# Patient Record
Sex: MALE | Race: WHITE | NOT HISPANIC OR LATINO | Employment: UNEMPLOYED | URBAN - METROPOLITAN AREA
[De-identification: names, ages, dates, MRNs, and addresses within clinical notes are randomized per-mention and may not be internally consistent; named-entity substitution may affect disease eponyms.]

---

## 2024-08-29 ENCOUNTER — OFFICE VISIT (OUTPATIENT)
Dept: UROLOGY | Facility: CLINIC | Age: 29
End: 2024-08-29
Payer: COMMERCIAL

## 2024-08-29 VITALS
TEMPERATURE: 98.2 F | DIASTOLIC BLOOD PRESSURE: 91 MMHG | HEART RATE: 128 BPM | SYSTOLIC BLOOD PRESSURE: 138 MMHG | HEIGHT: 72 IN

## 2024-08-29 DIAGNOSIS — M62.89 PELVIC FLOOR TENSION: Primary | ICD-10-CM

## 2024-08-29 DIAGNOSIS — R35.0 FREQUENCY OF URINATION: ICD-10-CM

## 2024-08-29 LAB
POC APPEARANCE, URINE: CLEAR
POC BILIRUBIN, URINE: NEGATIVE
POC BLOOD, URINE: NEGATIVE
POC COLOR, URINE: YELLOW
POC GLUCOSE, URINE: NEGATIVE MG/DL
POC KETONES, URINE: NEGATIVE MG/DL
POC LEUKOCYTES, URINE: NEGATIVE
POC NITRITE,URINE: NEGATIVE
POC PH, URINE: 6 PH
POC PROTEIN, URINE: NEGATIVE MG/DL
POC SPECIFIC GRAVITY, URINE: 1.01
POC UROBILINOGEN, URINE: 0.2 EU/DL

## 2024-08-29 PROCEDURE — 99203 OFFICE O/P NEW LOW 30 MIN: CPT | Performed by: NURSE PRACTITIONER

## 2024-08-29 PROCEDURE — 81003 URINALYSIS AUTO W/O SCOPE: CPT | Performed by: NURSE PRACTITIONER

## 2024-08-29 RX ORDER — OXYBUTYNIN CHLORIDE 5 MG/1
5 TABLET, EXTENDED RELEASE ORAL DAILY
Qty: 30 TABLET | Refills: 11 | Status: SHIPPED | OUTPATIENT
Start: 2024-08-29 | End: 2025-08-29

## 2024-08-29 NOTE — PROGRESS NOTES
UROLOGIC INITIAL EVALUATION     PROBLEM LIST:  1. Pelvic floor tension  Referral to Physical Therapy      2. Frequency of urination  POCT UA Automated manually resulted    oxybutynin XL (Ditropan XL) 5 mg 24 hr tablet           HISTORY OF PRESENT ILLNESS:   Vahe Villatoro is a 29 y.o. with no significant PMH  Presents today for evaluation and management of urinary issues  Seen accompanied by girlfriend  Reports symptoms started around 7/4  Notes frequency, small volume  Some suprapubic soreness  Has been able to overcome urge to some extent  Was only drinking iced tea, notes dark urine  Switched to water & cranberry juice; urine is lighter  0-1 cups of coffee/day  No obvious hematuria  Occasional drip, no nora incontinence    Dad, grandfather hx nephrolithiasis    PAST MEDICAL HISTORY:  No past medical history on file.    PAST SURGICAL HISTORY:  No past surgical history on file.     ALLERGIES:   Allergies   Allergen Reactions    Cat Dander Other        MEDICATIONS:   No current outpatient medications on file prior to visit.     No current facility-administered medications on file prior to visit.        SOCIAL HISTORY:  Patient  reports that he has been smoking cigarettes. He does not have any smokeless tobacco history on file. He reports that he does not currently use alcohol. He reports that he does not currently use drugs.   Social History     Socioeconomic History    Marital status: Single     Spouse name: Not on file    Number of children: Not on file    Years of education: Not on file    Highest education level: Not on file   Occupational History    Not on file   Tobacco Use    Smoking status: Every Day     Types: Cigarettes    Smokeless tobacco: Not on file   Substance and Sexual Activity    Alcohol use: Not Currently    Drug use: Not Currently    Sexual activity: Not on file   Other Topics Concern    Not on file   Social History Narrative    Not on file     Social Determinants of Health     Financial  "Resource Strain: Not on file   Food Insecurity: Not on file   Transportation Needs: Not on file   Physical Activity: Not on file   Stress: Not on file   Social Connections: Not on file   Intimate Partner Violence: Not on file   Housing Stability: Not on file       FAMILY HISTORY:  No family history on file.    REVIEW OF SYSTEMS:  All systems reviewed, pertinent negatives as noted in HPI.     PHYSICAL EXAM:  Visit Vitals  BP (!) 138/91   Pulse (!) 128   Temp 36.8 °C (98.2 °F) (Temporal)     Constitutional: Well-developed and well-nourished. No distress.    Head: Normocephalic and atraumatic.    Neck: Normal range of motion.     Pulmonary/Chest: Effort normal. No respiratory distress.   Abdominal: Non-distended.  : See below.  Integumentary: No rash or lesions visualized.  Musculoskeletal: Normal range of motion.    Neurological: Alert and oriented.  Psychiatric: Normal mood and affect. Thought content normal.      LABORATORY REVIEW:   No results found for: \"GLU\", \"BUN\", \"CREATININE\", \"EGFR\", \"NA\", \"K\", \"CL\", \"CO2\", \"OSMOLALITY\", \"CALCIUM\"   No results found for: \"WBC\", \"RBC\", \"HGB\", \"HCT\", \"MCV\", \"MCH\", \"MCHC\", \"RDW\", \"PLT\", \"MPV\"       Urine dipstick shows negative for all components.        Assessment:      1. Pelvic floor tension  Referral to Physical Therapy      2. Frequency of urination  POCT UA Automated manually resulted    oxybutynin XL (Ditropan XL) 5 mg 24 hr tablet          Vahe Villatoro is a 29 y.o. with urinary symptoms including frequency, urgency, bladder pressure/pain  UA negative   Discussed likely underlying pelvic floor tension/dysfunction     Plan:   Refer to pelvic floor PT; prefers to do self-directed therapy to start  RTC in 6-8 weeks for reassessment  Encouraged to contact us in the interim with any questions, concerns       "

## 2024-08-29 NOTE — PATIENT INSTRUCTIONS
Look for pelvic floor physical therapy videos for over-active bladder  If this is helpful, consider going to in-person therapy          Pelvic Floor PT Locations    [facility]  [address]  [place] [address]  [phone]      Johnna Enriquez, PT  B/B [facility]  [address]  [place] [address]  [phone]      Sophie Kwan, PT    B/B  [facility] [place]  [address] [place] [address]  [phone]    Neeru Peterson, PT         B/B   [facility]  [address] [place] [address]  [phone]    Corin Guevara, PT  (PRN)      B/B  Marga Dove, PT       B  [facility] Fried [facility]          [facility]  [address] [place] [address]  [phone]      Laura Medellin, PT      B/B  Vandana Burt, PT      B/B [facility]  [address]  [place] [address]  [phone]      Ck Jose, PT      B    [facility]  [address] [place] [address]  [phone]      Anna Calixto, PT (PRN)  B  Xuan Balderas, PT              B/B [facility]  [address] [place] [address]  [phone]    Amy Almaraz PT B [facility]  [address]  [place] [address]  [phone]      Annette Gunn, PT   B   [facility]  [address]  [place] [address]  [phone]      Laura Galvaiz, PT     B/B  Maria Antonia Cerrato, PT              B/B [facility]  [address] [place] [address]  [phone]    Chloé Pantoja, PT   (incontinence only) [facility]  [address]  [facility]. [place] [id number] [phone]    Lyly Degroot PT   B       [facility]    [address] [place]  [address]  [phone]      Agueda Hopkins, PT     B  Florencia Steinberg PT    B [facility] [place]  [address] [place] [address]  [phone]    Florencia Foote, PT       B/B  Neeru Peterson, PT         B  Radha Rice PT     B/B [facility]  [address]  [place], [place] [address] [phone]      Sanjuanita Santoyo, PT         B/B  Alyson Monteiro, PT   B/B     [facility]  [address]  [place] [address]  [phone]      Maria Antonia Cerrato, PT                  B/B  Florencia Mosher PT      B [facility], [address] [address]  [phone]    Radha Rice, PT     B/B   males/females Athletes only:  [facility]  [address]  [place] [address] [phone]    Viky Mccoy, PT    B   [facility] [address]  [address] [place] [address]  [phone]    Olga Argueta, PT B/B        B/B: [facility]adder